# Patient Record
Sex: MALE | Race: WHITE | NOT HISPANIC OR LATINO
[De-identification: names, ages, dates, MRNs, and addresses within clinical notes are randomized per-mention and may not be internally consistent; named-entity substitution may affect disease eponyms.]

---

## 2018-12-18 PROBLEM — Z00.00 ENCOUNTER FOR PREVENTIVE HEALTH EXAMINATION: Status: ACTIVE | Noted: 2018-12-18

## 2018-12-21 ENCOUNTER — APPOINTMENT (OUTPATIENT)
Dept: ORTHOPEDIC SURGERY | Facility: CLINIC | Age: 28
End: 2018-12-21
Payer: COMMERCIAL

## 2018-12-21 VITALS
BODY MASS INDEX: 31.5 KG/M2 | HEIGHT: 70 IN | HEART RATE: 71 BPM | DIASTOLIC BLOOD PRESSURE: 83 MMHG | SYSTOLIC BLOOD PRESSURE: 144 MMHG | WEIGHT: 220 LBS

## 2018-12-21 PROCEDURE — 99204 OFFICE O/P NEW MOD 45 MIN: CPT

## 2018-12-21 PROCEDURE — 72100 X-RAY EXAM L-S SPINE 2/3 VWS: CPT

## 2018-12-27 ENCOUNTER — OTHER (OUTPATIENT)
Age: 28
End: 2018-12-27

## 2018-12-29 ENCOUNTER — APPOINTMENT (OUTPATIENT)
Dept: MRI IMAGING | Facility: CLINIC | Age: 28
End: 2018-12-29

## 2018-12-30 ENCOUNTER — FORM ENCOUNTER (OUTPATIENT)
Age: 28
End: 2018-12-30

## 2018-12-31 ENCOUNTER — OUTPATIENT (OUTPATIENT)
Dept: OUTPATIENT SERVICES | Facility: HOSPITAL | Age: 28
LOS: 1 days | End: 2018-12-31
Payer: COMMERCIAL

## 2018-12-31 ENCOUNTER — APPOINTMENT (OUTPATIENT)
Dept: MRI IMAGING | Facility: CLINIC | Age: 28
End: 2018-12-31
Payer: COMMERCIAL

## 2018-12-31 DIAGNOSIS — M54.5 LOW BACK PAIN: ICD-10-CM

## 2018-12-31 PROCEDURE — 72148 MRI LUMBAR SPINE W/O DYE: CPT | Mod: 26

## 2018-12-31 PROCEDURE — 72148 MRI LUMBAR SPINE W/O DYE: CPT

## 2019-01-11 ENCOUNTER — APPOINTMENT (OUTPATIENT)
Dept: ORTHOPEDIC SURGERY | Facility: CLINIC | Age: 29
End: 2019-01-11
Payer: COMMERCIAL

## 2019-01-11 VITALS
HEIGHT: 70 IN | WEIGHT: 220 LBS | SYSTOLIC BLOOD PRESSURE: 145 MMHG | DIASTOLIC BLOOD PRESSURE: 83 MMHG | HEART RATE: 73 BPM | BODY MASS INDEX: 31.5 KG/M2

## 2019-01-11 DIAGNOSIS — M54.5 LOW BACK PAIN: ICD-10-CM

## 2019-01-11 PROCEDURE — 99214 OFFICE O/P EST MOD 30 MIN: CPT

## 2019-01-11 RX ORDER — DICLOFENAC SODIUM 75 MG/1
75 TABLET, DELAYED RELEASE ORAL
Qty: 1 | Refills: 1 | Status: ACTIVE | COMMUNITY
Start: 2019-01-11 | End: 1900-01-01

## 2019-01-11 NOTE — DISCUSSION/SUMMARY
[de-identified] : Disc protrusion at L3-L4.\par Lumbar degenerative disc disease L5-S1.\par discussed options\par PT\par Voltaren\par F/U in 3 weeks \par All options discussed including rest, medicine, home exercise, acupuncture, Chiropractic care, Physical Therapy, Pain management, and last resort surgery. \par All questions were answered, all alternatives discussed and the patient is in complete agreement with that plan. Follow-up appointment as instructed. Any issues and the patient will call or come in sooner.

## 2019-01-11 NOTE — PHYSICAL EXAM
[Full] : is full [UE/LE] : Sensory: Intact in bilateral upper & lower extremities [ALL] : dorsalis pedis, posterior tibial, femoral, popliteal, and radial 2+ and symmetric bilaterally [Poor Appearance] : well-appearing [Acute Distress] : not in acute distress [Obese] : not obese [Abl Mood] : in a normal mood [Abl Affect] : with normal affect [Poor Coordination] : normal coordination [Disorientation] : oriented x 3 [Painful] : not painful [FreeTextEntry2] :  5 out of 5 motor strength, sensation is intact and symmetrical full range of motion flexion extension and rotation, no palpatory tenderness full range of motion of hips knees shoulders and elbows (all four extremities), no atrophy, negative straight leg raise, no pathological reflexes, no swelling, normal ambulation, no apparent distress skin is intact, no palpable lymph nodes, no upper or lower extremity instability, alert and oriented x3 and normal mood. Normal finger-to nose test.  [de-identified] : Lumbar [de-identified] : \par \par Reviewed with the patient.\par \par \par \par \par EXAM: MR SPINE LUMBAR \par \par \par PROCEDURE DATE: 12/31/2018 \par \par \par \par INTERPRETATION: EXAMINATION: MRI of the lumbar spine without contrast \par \par CLINICAL INFORMATION: Low back pain \par \par TECHNIQUE: Multiplanar, multisequential MR imaging was performed. \par \par FINDINGS: Conus terminates at the L1-2 level and is normal in signal. \par Vertebral body heights are maintained. Alignment is normal. There are \par scattered intraosseous hemangiomas. \par \par T12-L1, L1-L2, L2-L3: No bulging or herniated intervertebral disc. No \par central canal stenosis or foraminal narrowing. \par \par L3-L4: Small left paracentral disc protrusion which contacts the left L4 \par nerve root. No central canal stenosis or foraminal narrowing. \par \par L4-L5: Minimal disc bulge. No central canal stenosis or foraminal narrowing. \par \par L5-S1: Small broad-based central to right paracentral disc protrusion which \par contacts the right S1 nerve root. No central canal stenosis or foraminal \par narrowing. \par \par IMPRESSION: Small broad-based central-right paracentral disc protrusion at \par L5-S1 which contacts the right S1 nerve root. \par \par Small left paracentral disc protrusion at L3-L4 which contacts the left L4 \par nerve root. \par \par \par \par \par \par \par \par \par BIANCA POMPA M.D., ATTENDING RADIOLOGIST \par This document has been electronically signed. Jan 2 2019 7:17PM \par \par \par \par \par Lumbar AP/Lat: Adequate lordosis, adequate disc height, no instability- Reviewed with the patient. Patient understands that I am not a radiologist.

## 2019-01-11 NOTE — HISTORY OF PRESENT ILLNESS
[Pain] : pain [Stable] : stable [All Other ROS Normal] : All other review of systems are negative except as noted [Headache] : no headache [Dizziness] : no dizziness [Fainting] : no fainting [FreeTextEntry1] : referred by Dr. Jett Mckeon for consult.\par LBP [FreeTextEntry2] : Chronic LBP x 10 yrs\par Worsened over the past year \par It's been worse over the last 2 weeks but today he is feeling better. He did go for his lumbar MRI.\par Pt points over the Left SI joint as #1 prob, radiates to buttock.\par Advil x 8 wks helps little\par No fever chills sweats nausea vomiting no bowel or bladder dysfunction, no recent weight loss or gain no night pain. This history is in addition to the intake form that I personally reviewed.

## 2025-04-21 ENCOUNTER — APPOINTMENT (OUTPATIENT)
Dept: PEDIATRIC MEDICAL GENETICS | Facility: CLINIC | Age: 35
End: 2025-04-21
Payer: COMMERCIAL

## 2025-04-21 DIAGNOSIS — Z31.440 ENCOUNTER OF MALE FOR TESTING FOR GENETIC DISEASE CARRIER STATUS FOR PROCREATIVE MANAGEMENT: ICD-10-CM

## 2025-04-21 DIAGNOSIS — Z81.8 FAMILY HISTORY OF OTHER MENTAL AND BEHAVIORAL DISORDERS: ICD-10-CM

## 2025-04-21 DIAGNOSIS — Z82.0 FAMILY HISTORY OF EPILEPSY AND OTHER DISEASES OF THE NERVOUS SYSTEM: ICD-10-CM

## 2025-04-21 DIAGNOSIS — Z78.9 OTHER SPECIFIED HEALTH STATUS: ICD-10-CM

## 2025-04-21 PROCEDURE — 96041 GENETIC COUNSELING SVC EA 30: CPT | Mod: 95

## 2025-04-21 RX ORDER — CALCIUM CARBONATE 260MG(650)
TABLET,CHEWABLE ORAL
Refills: 0 | Status: ACTIVE | COMMUNITY

## 2025-04-21 RX ORDER — ELECTROLYTES/DEXTROSE
SOLUTION, ORAL ORAL
Refills: 0 | Status: ACTIVE | COMMUNITY

## 2025-04-21 RX ORDER — VITAMIN K2 90 MCG
CAPSULE ORAL
Refills: 0 | Status: ACTIVE | COMMUNITY

## 2025-04-21 RX ORDER — ZINC SULFATE 50(220)MG
CAPSULE ORAL
Refills: 0 | Status: ACTIVE | COMMUNITY

## 2025-05-05 ENCOUNTER — NON-APPOINTMENT (OUTPATIENT)
Age: 35
End: 2025-05-05

## 2025-06-02 ENCOUNTER — NON-APPOINTMENT (OUTPATIENT)
Age: 35
End: 2025-06-02